# Patient Record
Sex: FEMALE | Race: WHITE | NOT HISPANIC OR LATINO | Employment: UNEMPLOYED | URBAN - METROPOLITAN AREA
[De-identification: names, ages, dates, MRNs, and addresses within clinical notes are randomized per-mention and may not be internally consistent; named-entity substitution may affect disease eponyms.]

---

## 2020-08-11 PROBLEM — G43.109 MIGRAINE WITH AURA AND WITHOUT STATUS MIGRAINOSUS, NOT INTRACTABLE: Status: ACTIVE | Noted: 2020-08-11

## 2021-03-05 PROBLEM — F31.9 BIPOLAR 1 DISORDER: Status: ACTIVE | Noted: 2021-03-05

## 2021-03-05 PROBLEM — F90.0 ATTENTION DEFICIT HYPERACTIVITY DISORDER (ADHD), PREDOMINANTLY INATTENTIVE TYPE: Status: ACTIVE | Noted: 2021-03-05

## 2021-03-05 PROBLEM — F41.9 ANXIETY: Status: ACTIVE | Noted: 2021-03-05

## 2021-03-05 PROBLEM — E78.5 HYPERLIPIDEMIA: Status: ACTIVE | Noted: 2021-03-05

## 2021-03-05 PROBLEM — R07.9 CHEST PAIN: Status: ACTIVE | Noted: 2021-03-05

## 2021-03-05 PROBLEM — R00.0 TACHYCARDIA: Status: ACTIVE | Noted: 2021-03-05

## 2021-03-05 PROBLEM — F32.A DEPRESSION: Status: ACTIVE | Noted: 2021-03-05

## 2021-12-06 ENCOUNTER — PATIENT MESSAGE (OUTPATIENT)
Dept: RESEARCH | Facility: HOSPITAL | Age: 36
End: 2021-12-06

## 2022-01-10 PROBLEM — G43.009 MIGRAINE WITHOUT AURA AND WITHOUT STATUS MIGRAINOSUS, NOT INTRACTABLE: Status: ACTIVE | Noted: 2022-01-10

## 2022-01-10 PROBLEM — G47.30 SLEEP APNEA: Status: ACTIVE | Noted: 2022-01-10

## 2022-03-25 PROBLEM — G43.709 CHRONIC MIGRAINE W/O AURA W/O STATUS MIGRAINOSUS, NOT INTRACTABLE: Status: ACTIVE | Noted: 2022-01-10

## 2023-02-20 PROBLEM — N81.11 CYSTOCELE, MIDLINE: Status: ACTIVE | Noted: 2023-02-20

## 2023-05-10 PROBLEM — N39.3 STRESS INCONTINENCE IN FEMALE: Status: ACTIVE | Noted: 2023-05-10

## 2023-05-10 PROBLEM — R31.29 MICROSCOPIC HEMATURIA: Status: ACTIVE | Noted: 2023-05-10

## 2024-05-20 ENCOUNTER — HOSPITAL ENCOUNTER (EMERGENCY)
Facility: HOSPITAL | Age: 39
Discharge: HOME | End: 2024-05-20
Attending: STUDENT IN AN ORGANIZED HEALTH CARE EDUCATION/TRAINING PROGRAM
Payer: MEDICAID

## 2024-05-20 VITALS
BODY MASS INDEX: 31.58 KG/M2 | DIASTOLIC BLOOD PRESSURE: 90 MMHG | TEMPERATURE: 98 F | RESPIRATION RATE: 16 BRPM | HEIGHT: 64 IN | OXYGEN SATURATION: 100 % | SYSTOLIC BLOOD PRESSURE: 122 MMHG | WEIGHT: 185 LBS | HEART RATE: 74 BPM

## 2024-05-20 DIAGNOSIS — S00.96XA INSECT BITE OF HEAD, UNSPECIFIED PART, INITIAL ENCOUNTER: Primary | ICD-10-CM
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
## 2024-05-20 PROCEDURE — 99283 EMERGENCY DEPT VISIT LOW MDM: CPT | Mod: ER

## 2024-05-20 PROCEDURE — 25000003 PHARM REV CODE 250: Mod: ER

## 2024-05-20 PROCEDURE — 63600175 PHARM REV CODE 636 W HCPCS: Mod: ER

## 2024-05-20 RX ORDER — FAMOTIDINE 20 MG/1
20 TABLET, FILM COATED ORAL 2 TIMES DAILY
Qty: 10 TABLET | Refills: 0 | Status: SHIPPED | OUTPATIENT
Start: 2024-05-20 | End: 2024-05-25

## 2024-05-20 RX ORDER — CETIRIZINE HYDROCHLORIDE 10 MG/1
10 TABLET ORAL DAILY
Qty: 30 TABLET | Refills: 0 | Status: SHIPPED | OUTPATIENT
Start: 2024-05-20 | End: 2025-05-20

## 2024-05-20 RX ORDER — CETIRIZINE HYDROCHLORIDE 10 MG/1
10 TABLET ORAL
Status: COMPLETED | OUTPATIENT
Start: 2024-05-20 | End: 2024-05-20

## 2024-05-20 RX ORDER — PREDNISONE 20 MG/1
40 TABLET ORAL DAILY
Qty: 10 TABLET | Refills: 0 | Status: SHIPPED | OUTPATIENT
Start: 2024-05-20 | End: 2024-05-25

## 2024-05-20 RX ORDER — PREDNISONE 20 MG/1
40 TABLET ORAL
Status: COMPLETED | OUTPATIENT
Start: 2024-05-20 | End: 2024-05-20

## 2024-05-20 RX ORDER — FAMOTIDINE 20 MG/1
20 TABLET, FILM COATED ORAL
Status: COMPLETED | OUTPATIENT
Start: 2024-05-20 | End: 2024-05-20

## 2024-05-20 RX ADMIN — FAMOTIDINE 20 MG: 20 TABLET, FILM COATED ORAL at 03:05

## 2024-05-20 RX ADMIN — CETIRIZINE HYDROCHLORIDE 10 MG: 10 TABLET, FILM COATED ORAL at 03:05

## 2024-05-20 RX ADMIN — PREDNISONE 40 MG: 20 TABLET ORAL at 03:05

## 2024-05-20 NOTE — ED PROVIDER NOTES
"Encounter Date: 2024       History     Chief Complaint   Patient presents with    Insect Bite     Pt was stung by wasp on Thursday and is still experiencing swelling to left cheek but is also having nausea and HA.      39-year-old female with past medical history of ADHD, anxiety, hyperlipidemia, migraine headaches, PTSD, diabetes type 2 presents to ED for emergent evaluation of left facial swelling and redness after being stung by a bee 4 days ago.  She reports some mild nausea.  She denies any emesis.  She denies any associated tongue swelling, difficulty swallowing, difficulty breathing, fever, chills, chest pain, shortness of breath, abdominal pain, vomiting, diarrhea, dysuria, hematuria.  No other symptoms reported.    The history is provided by the patient. No  was used.     Review of patient's allergies indicates:   Allergen Reactions    Celery (apium graveolens) (umbelliferae) Anaphylaxis           Cucumber (cucumis sativus) Anaphylaxis    Fioricet [butalbital-acetaminophen-caff]      Pt states this medication made her confused and have memory loss.     Nubain [nalbuphine] Other (See Comments)     "Rebound of migranes"    Sumatriptan succinate Other (See Comments)     Unsure but remembers it was migraine medicine  Unsure but remembers it was migraine medicine  Gives me lockjaw    Acetaminophen-codeine Palpitations    Omega-3 acid ethyl esters Nausea And Vomiting     Past Medical History:   Diagnosis Date    ADHD     Anxiety disorder, unspecified     Depression     Diverticulosis     Gastritis     HLD (hyperlipidemia)     Migraine headache with aura     Migraines     Ocular migraine     PTSD (post-traumatic stress disorder)     Seasonal allergies     Type 2 diabetes mellitus with unspecified diabetic retinopathy without macular edema      Past Surgical History:   Procedure Laterality Date    APPENDECTOMY  2003     SECTION      COLONOSCOPY W/ BIOPSIES      CYSTOSCOPY N/A " 09/11/2023    Procedure: CYSTOSCOPY;  Surgeon: Tim Schwartz MD;  Location: [address];  Service: Urology;  Laterality: N/A;    ESOPHAGOGASTRODUODENOSCOPY      HYSTERECTOMY  2016    REDUCTION OF BOTH BREASTS  2015    TONSILLECTOMY      TUBAL LIGATION  2015     Family History   Problem Relation Name Age of Onset    Diabetes Mother      Colon cancer Mother      COPD Mother      Breast cancer Sister      Breast cancer Paternal Grandmother       Social History     Tobacco Use    Smoking status: Former     Types: Cigarettes, Vaping with nicotine    Smokeless tobacco: Never   Substance Use Topics    Alcohol use: Not Currently    Drug use: Not Currently     Review of Systems   Constitutional:  Negative for chills and fever.   HENT:  Positive for facial swelling. Negative for congestion, ear pain, rhinorrhea and sore throat.    Eyes:  Negative for redness.   Respiratory:  Negative for cough and shortness of breath.    Cardiovascular:  Negative for chest pain.   Gastrointestinal:  Negative for abdominal pain, diarrhea, nausea and vomiting.   Genitourinary:  Negative for decreased urine volume, difficulty urinating, dysuria, frequency, hematuria and urgency.   Musculoskeletal:  Negative for back pain and neck pain.   Skin:  Positive for wound. Negative for rash.   Neurological:  Negative for headaches.   Psychiatric/Behavioral:  Negative for confusion.        Physical Exam     Initial Vitals [05/20/24 1447]   BP Pulse Resp Temp SpO2   (!) 122/90 74 16 97.9 °F (36.6 °C) 100 %      MAP       --         Physical Exam    Nursing note and vitals reviewed.  Constitutional: She appears well-developed and well-nourished.  Non-toxic appearance. She does not appear ill.   HENT:   Head: Normocephalic and atraumatic.   Right Ear: Hearing, tympanic membrane, external ear and ear canal normal. Tympanic membrane is not perforated, not erythematous and not bulging.   Left Ear: Hearing, tympanic membrane, external ear and ear canal  normal. Tympanic membrane is not perforated, not erythematous and not bulging.   Nose: Nose normal.   Mouth/Throat: Uvula is midline, oropharynx is clear and moist and mucous membranes are normal.    There is some erythema and some mild edema noted to left cheek.  Full range motion of jaw.  No trismus.  Airway intact.  No angioedema.  No tongue swelling.   Eyes: Conjunctivae and EOM are normal.   Neck: Neck supple.   Normal range of motion.   Full passive range of motion without pain.     Cardiovascular:  Normal rate and regular rhythm.           Pulses:       Radial pulses are 2+ on the right side and 2+ on the left side.   Pulmonary/Chest: Effort normal and breath sounds normal. No accessory muscle usage. No respiratory distress. She has no decreased breath sounds.   Abdominal: Abdomen is soft. Bowel sounds are normal. She exhibits no distension. There is no abdominal tenderness. There is no rebound and no guarding.   Musculoskeletal:         General: Normal range of motion.      Cervical back: Full passive range of motion without pain, normal range of motion and neck supple. No rigidity.     Neurological: She is alert. No cranial nerve deficit.   Neuro intact.  Strength and sensation intact bilateral upper and lower extremities.   Skin: Skin is warm and dry.   Psychiatric: She has a normal mood and affect.         ED Course   Procedures  Labs Reviewed - No data to display       Imaging Results    None          Medications   predniSONE tablet 40 mg (40 mg Oral Given 5/20/24 1526)   famotidine tablet 20 mg (20 mg Oral Given 5/20/24 1526)   cetirizine tablet 10 mg (10 mg Oral Given 5/20/24 1526)     Medical Decision Making  This is a 39-year-old female with past medical history of ADHD, anxiety, hyperlipidemia, migraine headaches, PTSD, diabetes type 2 presents to ED for emergent evaluation of left facial swelling and redness after being stung by a bee 4 days ago.  She reports some mild nausea.  She denies any  emesis.  She denies any associated tongue swelling, difficulty swallowing, difficulty breathing, fever, chills, chest pain, shortness of breath, abdominal pain, vomiting, diarrhea, dysuria, hematuria.  No other symptoms reported.   On physical exam, patient is well-appearing and in no acute distress.  Nontoxic appearing.  Lungs are clear to auscultation bilaterally.  Abdomen is soft and nontender.  No guarding, rigidity, rebound.  2+ radial pulses bilaterally.  Posterior oropharynx is not erythematous.  No edema or exudate.  Uvula midline.  Bilateral tympanic membrane is normal.  No erythema, bulging, or perforations.  Neuro intact.  Strength and sensation intact bilateral upper and lower extremities.  There is some erythema and some mild edema noted to left cheek.  Full range motion of jaw.  No trismus.  Airway intact.  No angioedema.  No tongue swelling.  Patient reports rebound headaches with Benadryl.  Pepcid, prednisone, Zyrtec ordered.  Will reassess.  Upon reassessment, the erythema to her left cheek has resolved.  Full range motion of jaw.  No trismus.  Airway intact.  No angioedema.  Will discharge patient on Pepcid, prednisone, Zyrtec.  Urged prompt follow-up with PCP for further evaluation.    Strict return precautions given. I discussed with the patient/family the diagnosis, treatment plan, indications for return to the emergency department, and for expected follow-up. The patient/family verbalized an understanding. The patient/family is asked if there are any questions or concerns. We discuss the case, until all issues are addressed to the patient/family's satisfaction. Patient/family understands and is agreeable to the plan. Patient is stable and ready for discharge.      Risk  OTC drugs.  Prescription drug management.                                      Clinical Impression:  Final diagnoses:  [S00.96XA, W57.XXXA] Insect bite of head, unspecified part, initial encounter (Primary)          ED  Disposition Condition    Discharge Stable          ED Prescriptions       Medication Sig Dispense Start Date End Date Auth. Provider    predniSONE (DELTASONE) 20 MG tablet Take 2 tablets (40 mg total) by mouth once daily. for 5 days 10 tablet 5/20/2024 5/25/2024 Allen Dc PA-C    famotidine (PEPCID) 20 MG tablet Take 1 tablet (20 mg total) by mouth 2 (two) times daily. for 5 days 10 tablet 5/20/2024 5/25/2024 Allen Dc PA-C    cetirizine (ZYRTEC) 10 MG tablet Take 1 tablet (10 mg total) by mouth once daily. 30 tablet 5/20/2024 5/20/2025 Allen Dc PA-C          Follow-up Information       Follow up With Specialties Details Why Contact Info    [facility] In 2 days for further evaluation [id number]   [address]  [facility] [id number]  [phone]      [facility] ED Emergency Medicine In 2 days If symptoms worsen [id number] [facility] [id number] [phone]             Allen Dc PA-C  05/20/24 8094

## 2024-05-20 NOTE — DISCHARGE INSTRUCTIONS
Please return to the Emergency Department for any new or worsening symptoms including: worsening swelling/redness, fever, chest pain, shortness of breath, loss of consciousness, dizziness, weakness, or any other concerns.     Please follow up with your Primary Care Provider within in the week. If you do not have one, you may contact the one listed on your discharge paperwork or you may also call the [facility] Appointment Desk at [phone] to schedule an appointment with one.     Please take all medication as prescribed.

## 2024-08-26 ENCOUNTER — HOSPITAL ENCOUNTER (EMERGENCY)
Facility: HOSPITAL | Age: 39
Discharge: HOME | End: 2024-08-26
Attending: EMERGENCY MEDICINE
Payer: MEDICAID

## 2024-08-26 VITALS
HEART RATE: 68 BPM | HEIGHT: 64 IN | TEMPERATURE: 98 F | DIASTOLIC BLOOD PRESSURE: 88 MMHG | WEIGHT: 178 LBS | BODY MASS INDEX: 30.39 KG/M2 | OXYGEN SATURATION: 100 % | SYSTOLIC BLOOD PRESSURE: 134 MMHG | RESPIRATION RATE: 16 BRPM

## 2024-08-26 DIAGNOSIS — S91.332A PUNCTURE WOUND OF LEFT FOOT: ICD-10-CM

## 2024-08-26 LAB — POCT GLUCOSE: 105 MG/DL (ref 70–110)

## 2024-08-26 PROCEDURE — 25000003 PHARM REV CODE 250: Mod: ER | Performed by: NURSE PRACTITIONER

## 2024-08-26 PROCEDURE — 90471 IMMUNIZATION ADMIN: CPT | Mod: ER | Performed by: NURSE PRACTITIONER

## 2024-08-26 PROCEDURE — 99284 EMERGENCY DEPT VISIT MOD MDM: CPT | Mod: 25,ER

## 2024-08-26 PROCEDURE — 90715 TDAP VACCINE 7 YRS/> IM: CPT | Mod: ER | Performed by: NURSE PRACTITIONER

## 2024-08-26 PROCEDURE — 63600175 PHARM REV CODE 636 W HCPCS: Mod: ER | Performed by: NURSE PRACTITIONER

## 2024-08-26 PROCEDURE — 82962 GLUCOSE BLOOD TEST: CPT | Mod: ER

## 2024-08-26 RX ORDER — NAPROXEN 250 MG/1
500 TABLET ORAL
Status: COMPLETED | OUTPATIENT
Start: 2024-08-26 | End: 2024-08-26

## 2024-08-26 RX ORDER — CIPROFLOXACIN 500 MG/1
500 TABLET ORAL
Status: COMPLETED | OUTPATIENT
Start: 2024-08-26 | End: 2024-08-26

## 2024-08-26 RX ORDER — SULFAMETHOXAZOLE AND TRIMETHOPRIM 800; 160 MG/1; MG/1
1 TABLET ORAL
Status: COMPLETED | OUTPATIENT
Start: 2024-08-26 | End: 2024-08-26

## 2024-08-26 RX ORDER — NAPROXEN 500 MG/1
500 TABLET ORAL 2 TIMES DAILY WITH MEALS
Qty: 14 TABLET | Refills: 0 | Status: SHIPPED | OUTPATIENT
Start: 2024-08-26

## 2024-08-26 RX ORDER — AMOXICILLIN AND CLAVULANATE POTASSIUM 875; 125 MG/1; MG/1
1 TABLET, FILM COATED ORAL 2 TIMES DAILY
Qty: 14 TABLET | Refills: 0 | Status: SHIPPED | OUTPATIENT
Start: 2024-08-26

## 2024-08-26 RX ORDER — AMOXICILLIN AND CLAVULANATE POTASSIUM 875; 125 MG/1; MG/1
1 TABLET, FILM COATED ORAL
Status: COMPLETED | OUTPATIENT
Start: 2024-08-26 | End: 2024-08-26

## 2024-08-26 RX ORDER — CIPROFLOXACIN 500 MG/1
500 TABLET ORAL 2 TIMES DAILY
Qty: 20 TABLET | Refills: 0 | Status: SHIPPED | OUTPATIENT
Start: 2024-08-26 | End: 2024-09-05

## 2024-08-26 RX ORDER — BACITRACIN 500 [USP'U]/G
OINTMENT TOPICAL
Status: COMPLETED | OUTPATIENT
Start: 2024-08-26 | End: 2024-08-26

## 2024-08-26 RX ADMIN — NAPROXEN 500 MG: 250 TABLET ORAL at 10:08

## 2024-08-26 RX ADMIN — TETANUS TOXOID, REDUCED DIPHTHERIA TOXOID AND ACELLULAR PERTUSSIS VACCINE, ADSORBED 0.5 ML: 5; 2.5; 8; 8; 2.5 SUSPENSION INTRAMUSCULAR at 10:08

## 2024-08-26 RX ADMIN — SULFAMETHOXAZOLE AND TRIMETHOPRIM 1 TABLET: 800; 160 TABLET ORAL at 10:08

## 2024-08-26 RX ADMIN — BACITRACIN: 500 OINTMENT TOPICAL at 10:08

## 2024-08-26 RX ADMIN — AMOXICILLIN AND CLAVULANATE POTASSIUM 1 TABLET: 875; 125 TABLET, FILM COATED ORAL at 10:08

## 2024-08-26 RX ADMIN — CIPROFLOXACIN 500 MG: 500 TABLET ORAL at 11:08

## 2024-08-27 NOTE — DISCHARGE INSTRUCTIONS
Take antibiotics as ordered. You have been prescribed naprosyn (naproxen sodium), an anti-inflammatory.  Take this medication whether you feel you need it or not.  Do not take ibuprofen, naproxen or other NSAID's medications while taking this medication. Return to the Emergency Department for any worsening, change in condition, or any emergent concerns.

## 2024-08-27 NOTE — ED NOTES
Pt foot cleansed thoroughly with hibiclens cleanser, iodine swabs then bacitracin ointment applied covered with non-adherent dressing and wrapped with coban.

## 2024-08-27 NOTE — ED PROVIDER NOTES
"Encounter Date: 2024       History     Chief Complaint   Patient presents with    Puncture Wound     C/O STEPPED ON SALVADOR NAIL AT 1400, NEEDS TDAP     Cc: Puncture wound     History of present illness: Patient is a 39-year-old female who states that around 14:00 she stepped on a nail that was salvador and and had been used in the construction of a chicken coop.  She reports pain to the left foot as well as redness and streaking.  Denies any discharge.  Denies fever and all other symptoms at this time.  Believes that she is due for a tetanus shot.    The history is provided by the patient. No  was used.     Review of patient's allergies indicates:   Allergen Reactions    Celery (apium graveolens) (umbelliferae) Anaphylaxis           Cucumber (cucumis sativus) Anaphylaxis    Fioricet [butalbital-acetaminophen-caff]      Pt states this medication made her confused and have memory loss.     Nubain [nalbuphine] Other (See Comments)     "Rebound of migranes"    Sumatriptan succinate Other (See Comments)     Unsure but remembers it was migraine medicine  Unsure but remembers it was migraine medicine  Gives me lockjaw    Acetaminophen-codeine Palpitations    Omega-3 acid ethyl esters Nausea And Vomiting     Past Medical History:   Diagnosis Date    ADHD     Anxiety disorder, unspecified     Depression     Diverticulosis     Gastritis     HLD (hyperlipidemia)     Migraine headache with aura     Migraines     Ocular migraine     PTSD (post-traumatic stress disorder)     Seasonal allergies     Type 2 diabetes mellitus with unspecified diabetic retinopathy without macular edema      Past Surgical History:   Procedure Laterality Date    APPENDECTOMY       SECTION      COLONOSCOPY W/ BIOPSIES      CYSTOSCOPY N/A 2023    Procedure: CYSTOSCOPY;  Surgeon: Tim Schwartz MD;  Location: [address];  Service: Urology;  Laterality: N/A;    ESOPHAGOGASTRODUODENOSCOPY      HYSTERECTOMY  " 2016    REDUCTION OF BOTH BREASTS  2015    TONSILLECTOMY      TUBAL LIGATION  2015     Family History   Problem Relation Name Age of Onset    Diabetes Mother      Colon cancer Mother      COPD Mother      Breast cancer Sister      Breast cancer Paternal Grandmother       Social History     Tobacco Use    Smoking status: Former     Types: Cigarettes, Vaping with nicotine    Smokeless tobacco: Never   Substance Use Topics    Alcohol use: Not Currently    Drug use: Not Currently     Review of Systems   Skin:  Positive for wound.   All other systems reviewed and are negative.      Physical Exam     Initial Vitals [08/26/24 2136]   BP Pulse Resp Temp SpO2   124/87 79 18 98 °F (36.7 °C) 99 %      MAP       --         Physical Exam    Nursing note and vitals reviewed.  Constitutional: She appears well-developed and well-nourished.   HENT:   Head: Normocephalic and atraumatic.   Right Ear: External ear normal.   Left Ear: External ear normal.   Nose: Nose normal.   Eyes: Conjunctivae and EOM are normal. Pupils are equal, round, and reactive to light. Right eye exhibits no discharge. Left eye exhibits no discharge.   Neck:   Normal range of motion.  Abdominal: She exhibits no distension.   Musculoskeletal:         General: Normal range of motion.      Cervical back: Normal range of motion.     Neurological: She is alert and oriented to person, place, and time.   Skin: Skin is dry. Capillary refill takes less than 2 seconds.   Left foot with visible puncture wound with 3 in of streaking along the plantar surface towards the arch of the foot.  No surrounding erythema or exudate.  Distal P SM is intact.         ED Course   Procedures  Labs Reviewed   POCT GLUCOSE       Result Value    POCT Glucose 105            Imaging Results              X-Ray Foot Complete Left (Final result)  Result time 08/26/24 23:32:53      Final result by Johnny Vaughn MD (08/26/24 23:32:53)                   Impression:      No acute osseous  abnormality identified.      Electronically signed by: Johnny Vaughn MD  Date:    08/26/2024  Time:    23:32               Narrative:    EXAMINATION:  XR FOOT COMPLETE 3 VIEW LEFT    CLINICAL HISTORY:  .  Puncture wound without foreign body, left foot, initial encounter    TECHNIQUE:  AP, lateral and oblique views of the left foot were performed.    COMPARISON:  None    FINDINGS:  No evidence of acute displaced fracture, dislocation, or osseous destructive process.  Lisfranc articulation is congruent.  No radiopaque retained foreign body seen.                                       Medications   Tdap (BOOSTRIX) vaccine injection 0.5 mL (0.5 mLs Intramuscular Given 8/26/24 2235)   amoxicillin-clavulanate 875-125mg per tablet 1 tablet (1 tablet Oral Given 8/26/24 2250)   sulfamethoxazole-trimethoprim 800-160mg per tablet 1 tablet (1 tablet Oral Given 8/26/24 2250)   bacitracin ointment ( Topical (Top) Given 8/26/24 2251)   naproxen tablet 500 mg (500 mg Oral Given 8/26/24 2250)   ciprofloxacin HCl tablet 500 mg (500 mg Oral Given 8/26/24 2342)     Medical Decision Making  Patient is a 39-year-old female who states that around 14:00 she stepped on a nail that was carl and and had been used in the construction of a chicken coop.  She reports pain to the left foot as well as redness and streaking.  Denies any discharge.  Denies fever and all other symptoms at this time.  Believes that she is due for a tetanus shot.    Left foot with visible puncture wound with 3 in of streaking along the plantar surface towards the arch of the foot.  No surrounding erythema or exudate.  Distal P SM is intact.     Differential diagnosis includes puncture wound tetanus residual foreign body    Problems Addressed:  Puncture wound of left foot: acute illness or injury     Details: Complete report was given to Dr. Thompson.  He suggested coverage for MRSA and Pseudomonas which was done.  The wound was cleansed and dressed by nursing staff as  "directed with bacitracin.  Naproxen was given for pain control and tetanus was updated.  The patient was discharged on Cipro and Augmentin with a prescription for Naprosyn for discomfort.  Follow up as directed.  She understands to watch it closely for any spreading of redness warmth streaking or discharge she should return promptly.    Amount and/or Complexity of Data Reviewed  Labs: ordered. Decision-making details documented in ED Course.  Radiology: ordered.  Discussion of management or test interpretation with external provider(s): Vital signs at the time of disposition were:  BP (P) 134/88   Pulse (P) 68   Temp 98 °F (36.7 °C) (Oral)   Resp (P) 16   Ht 5' 4" (1.626 m)   Wt 80.7 kg (178 lb)   SpO2 (P) 100%   BMI 30.55 kg/m²       See AVS for additional recommendations. Medications listed herein were prescribed after reviewing the patient's allergies, medication list, history, most recent laboratories as available.  Referrals below were provided after reviewing the patient's previous medical providers. She understands she  should return for any worsening or changes in condition.  Prior to discharge the patient was asked if she  had any additional concerns or complaints and she declined. The patient was given an opportunity to ask questions and all were answered to her satisfaction.     Risk  OTC drugs.  Prescription drug management.  Diagnosis or treatment significantly limited by social determinants of health.               ED Course as of 08/26/24 2345   Mon Aug 26, 2024   2230 POCT Glucose: 105 [VC]   2230 BP: 124/87 [VC]   2230 Temp: 98 °F (36.7 °C) [VC]   2230 Temp Source: Oral [VC]   2230 Pulse: 79 [VC]   2230 Resp: 18 [VC]   2230 SpO2: 99 % [VC]      ED Course User Index  [VC] Dougie Aragon DNP                           Clinical Impression:  Final diagnoses:  [S91.332A] Puncture wound of left foot          ED Disposition Condition    Discharge Stable          ED Prescriptions       " Medication Sig Dispense Start Date End Date Auth. Provider    amoxicillin-clavulanate 875-125mg (AUGMENTIN) 875-125 mg per tablet Take 1 tablet by mouth 2 (two) times daily. 14 tablet 8/26/2024 -- Dougie Aragon DNP    ciprofloxacin HCl (CIPRO) 500 MG tablet Take 1 tablet (500 mg total) by mouth 2 (two) times daily. for 10 days 20 tablet 8/26/2024 9/5/2024 Dougie Aragon DNP    naproxen (NAPROSYN) 500 MG tablet Take 1 tablet (500 mg total) by mouth 2 (two) times daily with meals. 14 tablet 8/26/2024 -- Dougie Aragon DNP          Follow-up Information       Follow up With Specialties Details Why Contact Info    [facility] -  Schedule an appointment as soon as possible for a visit   [address]  [place] [address]  [phone]               Dougie Aragon DNP  08/26/24 6587